# Patient Record
Sex: MALE | Race: WHITE
[De-identification: names, ages, dates, MRNs, and addresses within clinical notes are randomized per-mention and may not be internally consistent; named-entity substitution may affect disease eponyms.]

---

## 2019-02-07 ENCOUNTER — HOSPITAL ENCOUNTER (EMERGENCY)
Dept: HOSPITAL 25 - ED | Age: 34
Discharge: HOME | End: 2019-02-07
Payer: COMMERCIAL

## 2019-02-07 VITALS — DIASTOLIC BLOOD PRESSURE: 68 MMHG | SYSTOLIC BLOOD PRESSURE: 114 MMHG

## 2019-02-07 DIAGNOSIS — R53.1: Primary | ICD-10-CM

## 2019-02-07 DIAGNOSIS — F31.9: ICD-10-CM

## 2019-02-07 DIAGNOSIS — Z87.891: ICD-10-CM

## 2019-02-07 LAB
ALBUMIN SERPL BCG-MCNC: 4.2 G/DL (ref 3.2–5.2)
ALBUMIN/GLOB SERPL: 1.9 {RATIO} (ref 1–3)
ALP SERPL-CCNC: 47 U/L (ref 34–104)
ALT SERPL W P-5'-P-CCNC: 11 U/L (ref 7–52)
ANION GAP SERPL CALC-SCNC: 6 MMOL/L (ref 2–11)
AST SERPL-CCNC: 16 U/L (ref 13–39)
BASOPHILS # BLD AUTO: 0 10^3/UL (ref 0–0.2)
BUN SERPL-MCNC: 15 MG/DL (ref 6–24)
BUN/CREAT SERPL: 16.1 (ref 8–20)
CALCIUM SERPL-MCNC: 9 MG/DL (ref 8.6–10.3)
CHLORIDE SERPL-SCNC: 104 MMOL/L (ref 101–111)
CK SERPL-CCNC: 136 U/L (ref 10–223)
EOSINOPHIL # BLD AUTO: 0.2 10^3/UL (ref 0–0.6)
GLOBULIN SER CALC-MCNC: 2.2 G/DL (ref 2–4)
GLUCOSE SERPL-MCNC: 110 MG/DL (ref 70–100)
HCO3 SERPL-SCNC: 29 MMOL/L (ref 22–32)
HCT VFR BLD AUTO: 39 % (ref 42–52)
HGB BLD-MCNC: 13 G/DL (ref 14–18)
LYMPHOCYTES # BLD AUTO: 2.3 10^3/UL (ref 1–4.8)
MAGNESIUM SERPL-MCNC: 2 MG/DL (ref 1.9–2.7)
MCH RBC QN AUTO: 31 PG (ref 27–31)
MCHC RBC AUTO-ENTMCNC: 33 G/DL (ref 31–36)
MCV RBC AUTO: 93 FL (ref 80–94)
MONOCYTES # BLD AUTO: 0.7 10^3/UL (ref 0–0.8)
NEUTROPHILS # BLD AUTO: 3.6 10^3/UL (ref 1.5–7.7)
NRBC # BLD AUTO: 0 10^3/UL
NRBC BLD QL AUTO: 0
PLATELET # BLD AUTO: 190 10^3/UL (ref 150–450)
POTASSIUM SERPL-SCNC: 3.6 MMOL/L (ref 3.5–5)
PROT SERPL-MCNC: 6.4 G/DL (ref 6.4–8.9)
RBC # BLD AUTO: 4.21 10^6/UL (ref 4–5.4)
SODIUM SERPL-SCNC: 139 MMOL/L (ref 135–145)
WBC # BLD AUTO: 6.8 10^3/UL (ref 3.5–10.8)

## 2019-02-07 PROCEDURE — 99283 EMERGENCY DEPT VISIT LOW MDM: CPT

## 2019-02-07 PROCEDURE — 80164 ASSAY DIPROPYLACETIC ACD TOT: CPT

## 2019-02-07 PROCEDURE — 82550 ASSAY OF CK (CPK): CPT

## 2019-02-07 PROCEDURE — 85025 COMPLETE CBC W/AUTO DIFF WBC: CPT

## 2019-02-07 PROCEDURE — 96360 HYDRATION IV INFUSION INIT: CPT

## 2019-02-07 PROCEDURE — 83735 ASSAY OF MAGNESIUM: CPT

## 2019-02-07 PROCEDURE — 96361 HYDRATE IV INFUSION ADD-ON: CPT

## 2019-02-07 PROCEDURE — 36415 COLL VENOUS BLD VENIPUNCTURE: CPT

## 2019-02-07 PROCEDURE — 80053 COMPREHEN METABOLIC PANEL: CPT

## 2019-02-07 NOTE — XMS REPORT
Continuity of Care Document (CCD)

 Created on:2019



Patient:Slade Moeller

Sex:Male

:1985

External Reference #:[id number]





Demographics







 Address  [address] [place] [address]

 

 Mobile Phone  [phone]

 

 Email Address  [email or web address]

 

 Preferred Language  en

 

 Marital Status  Declined to Specify/Unknown

 

 [employer] Affiliation  Unknown

 

 Race  Unknown

 

 Ethnic Group  Declined to Specify/Unknown









Author







 Name  Cortez Reese









Support







 Name  Relationship  Address  Phone

 

 Shawna Mathew  Unavailable  Unavailable  [phone]









Care Team Providers







 Name  Role  Phone

 

 Parth Crenshaw MD  Primary Care Physician  Unavailable









Payers







 Type  Date  Identification Numbers  Payment Provider  Subscriber

 

   Effective:  Policy Number: [id number]  [employer]  Slade Moeller



   2017      









 PayID: [id number]  [address] [address]









 [place] [address]







Advance Directives







 Description

 

 No Information Available







Problems







 Description

 

 No Information







Family History







 Date  Family Member(s)  Problem(s)  Comments

 

   Father  Congestive Heart Failure (CHF)  

 

   Father  Congestive Heart Failure (CHF)  WPW Syndrome



       alcoholic

 

   Father  Hypercholesterolemia  

 

   Mother  Hypercholesterolemia  

 

   Mother  Hypercholesterolemia  MGF dec of MI

 

 Onset:  Siblings  3  



 (2017)      

 

   Siblings  3  1 brother born with



       "hole in the heart"







Social History







 Type  Date  Description  Comments

 

 Birth Sex    Unknown  

 

 Marital Status    Single  

 

 Lives With    Roommate  

 

 Occupation      

 

 ETOH Use    recovering alcoholic  2019 still trying



       to get to a state of



       abstinence in regards



       to alcohol

 

 Tobacco Use  Start: Unknown  Patient has never  



     smoked  

 

 Recreational Drug Use    Denies Drug Use  

 

 Smoking Status  Reviewed: 19  Patient has never  



     smoked  

 

 Exercise Type/Frequency    Exercises regularly  







Allergies, Adverse Reactions, Alerts







 Description

 

 No Known Drug Allergies







Medications







 Medication  Date  Status  Form  Strength  Qnty  SIG  Indications  Ordering



                 Provider

 

 Depakote    Active    500mg    3 po q hs    Unknown



   00              

 

 Seroquel    Active  Tablets  50mg    take 1/2    Unknown



   00          tablet by    



             mouth at    



             bedtime    

 

                 

 

 Ibuprofen    Hx  Tablets  600mg    three    Unknown



   00 -          times a    



   20          day prn    



   17              

 

 Azelastine HCL    Hx  Solution  0.05%    1 drop    Unknown



 (Ophthalmic)  00 -          each eye    



   11/05/20          twice    



   17          daily    







Immunizations







 Description

 

 No Information Available







Vital Signs







 Date  Vital  Result  Comment

 

 2019  3:56pm  Height  68 inches  5'8"









 Weight  180.00 lb  with shoes

 

 Heart Rate  78 /min  

 

 BP Systolic Sitting  118 mmHg  

 

 BP Diastolic Sitting  80 mmHg  

 

 BMI (Body Mass Index)  27.4 kg/m2  

 

 Ejection Fraction  55-60%  echo 17 12:56pm  Height  68 inches  5'8"









 Weight  195.00 lb  w/shoes

 

 Heart Rate  80 /min  

 

 BP Systolic Sitting  112 mmHg  LA lg cuff

 

 BP Diastolic Sitting  74 mmHg  LA lg cuff

 

 BMI (Body Mass Index)  29.6 kg/m2  

 

 Ejection Fraction  55-60%  Echo 17  8:34am  Height  68 inches  5'8"









 Weight  195.75 lb  with shoes

 

 Heart Rate  68 /min  

 

 BP Systolic Sitting  114 mmHg  LA reg cuff

 

 BP Diastolic Sitting  78 mmHg  LA reg cuff

 

 BMI (Body Mass Index)  29.8 kg/m2  







Results







 Test  Date  Facility  Test  Result  H/L  Range  Note

 

 Comp Metabolic Panel  2017  [facility]  Sodium  138 mmol/L  N
  133-145  



     [address]          



     [place] [address] [phone]          









 Potassium  4.6 mmol/L  N  3.5-5.0  

 

 Chloride  101 mmol/L  N  101-111  

 

 Co2 Carbon Dioxide  30 mmol/L  N  22-32  

 

 Anion Gap  7 mmol/L  N  2-11  

 

 Glucose  79 mg/dL  N    

 

 Blood Urea Nitrogen  11 mg/dL  N  6-24  

 

 Creatinine  0.83 mg/dL  N  0.67-1.17  

 

 BUN/Creatinine Ratio  13.3  N  8-20  

 

 Calcium  9.4 mg/dL  N  8.6-10.3  

 

 Total Protein  6.5 g/dL  N  6.4-8.9  

 

 Albumin  4.3 g/dL  N  3.2-5.2  

 

 Globulin  2.2 g/dL  N  2-4  

 

 Albumin/Globulin Ratio  2.0  N  1-3  

 

 Total Bilirubin  0.60 mg/dL  N  0.2-1.0  

 

 Alkaline Phosphatase  46 U/L  N    

 

 Alt  9 U/L  N  7-52  

 

 Ast  19 U/L  N  13-39  

 

 Egfr Non-  106.1    >60  

 

 Egfr   136.4    >60  1









 Laboratory test  2017  [facility]  TSH (Thyroid  1.78 mcIU/mL
  N  0.34-5.60  2



 finding    [address]  Stim Horm)        



     [place] [address] [phone]          









 Vitamin B12  423 pg/mL  N  180-914  3









 Vitamin D 1,25  2017  [facility]  Vitamin D Total  32.9 ng/mL
  N  20-50  4



 And Vitamin D,2    [address]  25(Oh)        



     [place] [address] [phone]          









 Vitamin D, 1,25 Dihydroxy  42 pg/mL    18-64  5









 Lipid Panel -  2017  [facility]  Creatine Kinase(CK)  119 U/L
  N    6



 JFM    [address]          



     [place] [address] [phone]          

 

 Lipid Profile  2017  [facility]  Triglycerides  66 mg/dL      
7



 (Trig/Chol/HDL    [address]          



 )    [place] [address] [phone]          









 Cholesterol  168 mg/dL      8

 

 HDL Cholesterol  49.8 mg/dL      9

 

 LDL Cholesterol  105 mg/dL      10









 Laboratory test  2017  [facility]  Erythrocyte Sed  5 mm/Hr  
N  0-14  11



 finding    [address]  Rate        



     [place] [address] [phone]          









 C Reactive Protein  < 1.00 mg/L  N  < 5.00  12









 1  *******Because ethnic data is not always readily available,



   this report includes an eGFR for both -Americans and



   non- Americans.****



   The National Kidney Disease Education Program (NKDEP) does



   not endorse the use of the MDRD equation for patients that



   are not between the ages of 18 and 70, are pregnant, have



   extremes of body size, muscle mass, or nutritional status,



   or are non- or non-.



   According to the National Kidney Foundation, irrespective of



   diagnosis, the stage of the disease is based on the level of



   kidney function:



   Stage Description                      GFR(mL/min/1.73 m(2))



   1     Kidney damage with normal or decreased GFR       90



   2     Kidney damage with mild decrease in GFR          60-89



   3     Moderate decrease in GFR                         30-59



   4     Severe decrease in GFR                           15-29



   5     Kidney failure                       <15 (or dialysis)

 

 2  FASTING



   fasting

 

 3  Normal Range 180 to 914



   Indeterminate Range 145 to 180



   Deficient Range  <145

 

 4  FASTING



   fasting

 

 5  -------------------ADDITIONAL INFORMATION-------------------



   This test was developed and its performance characteristics



   determined by [facility] in a manner consistent with CLIA



   requirements. This test has not been cleared or approved by



   the U.S. Food and Drug Administration.



   Test Performed by:



   [facility]



   [address], [place] [address]

 

 6  FASTING



   fasting

 

 7  Desirable: <150



   Borderline High: 150-199



   High: 200-499



   Very High: >500

 

 8  Desirable: <200



   Borderline High: 200-239



   High: >239

 

 9  Low: <40



   Desirable: 40-60



   High: >60

 

 10  Desirable: <100



   Near Optimal: 100-129



   Borderline High: 130-159



   High: 160-189



   Very High: >189

 

 11  FASTING



   fasting

 

 12  Acute inflammation:  >10.00







Procedures







 Date  Code  Description  Status

 

 2019  20758  EKG Tracing & Interpretation  Completed

 

 2017  11241  Holter Monitor Review (24 hr)dr review & interp only  
Completed

 

 2017  27606  ECG Monitor/Recording W/Visual Superimposition Scanning  
Completed

 

 2017  50660  ECHO Stress Test Incl Perf Contiuous ekg Monitoring W/Phys  
Completed



     Superv  

 

 2017  66815  ECHO Transthoracic, Real-Time 2D With Doppler And Color  
Completed



     Flow  

 

 2017  74344  EKG Tracing & Interpretation  Completed







Encounters







 Type  Date  Location  Provider  Dx  Diagnosis

 

 Office Visit  2017  [facility]  Qutaybeh S.  R00.2  Palpitations



   1:00p    BRIDGET May    

 

 Office Visit  2017  [facility]  Qutaybeh S.  R00.2  Palpitations



   9:00a    BRIDGET May    









 R07.9  Chest pain, unspecified

 

 F10.10  Alcohol abuse, uncomplicated

 

 R06.02  Shortness of breath

 

 R94.31  Abnormal electrocardiogram [ECG] [EKG]

 

 Z82.49  Family hx of ischem heart dis and oth dis of the circ sys







Plan of Treatment

Future Appointment(s):2019 11:00 am - [facility] ECHO Schedule at [facility]2019  8:30 am - Nurse Visit cc at [facility]2019 10
:00 am - Nurse Visit cc at [facility]2019 - Qutaybeh S. Maghaydah, M.D.R00.2 PalpitationsFollow up:[age] yr ovI77.819 Aortic ectasia, unspecified 
siteNew Orders:Echocardiogram, Scheduled: 19R00.1 Bradycardia, 
unspecifiedNew Orders:24 hour holter monitor, Scheduled: 19R53.83 Other 
fatigue

## 2019-02-07 NOTE — ED
Complex/Multi-Sys Presentation





- HPI Summary


HPI Summary: 


This patient is a 33 year old M presenting to ED with a chief complaint of 

weakness of arms and legs since PTA. He woke up an hour after taking Seroquel (

he takes 25 mg every night but has been skipping some nights recently) and 

couldnt move. He called the ambulance and they went to his house and checked 

up on him. He went to the bathroom after the ambulance left and vomited. The 

patient rates the pain 4/10 in severity. Symptoms aggravated by nothing. 

Symptoms alleviated by nothing. Patient has been going to a cardiologist 

because of CP. He reports that he has lied to his cardiologist about using 

marijuana.





- History Of Current Complaint


Chief Complaint: EDGeneral


Time Seen by Provider: 02/07/19 02:47


Hx Obtained From: Patient


Onset/Duration: Sudden Onset, Lasting Hours


Timing: Hours


Severity Currently: Mild


Severity Initially: Mild - 4/10


Aggravating Factor(s): nothing


Alleviating Factor(s): nothing


Associated Signs And Symptoms: Positive: Weakness - of arms and legs, Nausea, 

Vomiting





- Allergies/Home Medications


Allergies/Adverse Reactions: 


 Allergies











Allergy/AdvReac Type Severity Reaction Status Date / Time


 


No Known Allergies Allergy   Verified 11/21/16 20:48














PMH/Surg Hx/FS Hx/Imm Hx


Endocrine/Hematology History: 


   Denies: Hx Diabetes, Hx Thyroid Disease


Cardiovascular History: 


   Denies: Hx Hypertension


Respiratory History: 


   Denies: Hx Asthma, Hx Chronic Obstructive Pulmonary Disease (COPD)


GI History: 


   Denies: Hx Ulcer


Psychiatric History: Reports: Hx Bipolar Disorder


Infectious Disease History: Yes


Infectious Disease History: 


   Denies: Hx Hepatitis, Hx Human Immunodeficiency Virus (HIV), Traveled 

Outside the [place] in Last 30 Days





- Family History


Known Family History: 


   Negative: Cardiac Disease, Hypertension





- Social History


Alcohol Use: None


Substance Use Type: Reports: Marijuana


Smoking Status (MU): Former Smoker





Review of Systems


Positive: Chest Pain - sees a cardiologist for this issue


Positive: Vomiting, Nausea


Positive: Weakness - of arms and legs


All Other Systems Reviewed And Are Negative: Yes





Physical Exam





- Summary


Physical Exam Summary: 


VITAL SIGNS: Reviewed.


GENERAL: Patient is a well-developed and nourished MALE who is lying 

comfortable in the stretcher. Patient is not in any acute respiratory distress.


HEAD AND FACE: No signs of trauma. No ecchymosis, hematomas or skull 

depressions. No sinus tenderness.


EYES: PERRLA, EOMI x 2, No injected conjunctiva, no nystagmus.


EARS: Hearing grossly intact. Ear canals and tympanic membranes are within 

normal limits.


MOUTH: Oropharynx within normal limits.


NECK: Supple, trachea is midline, no adenopathy, no JVD, no carotid bruit, no c-

spine tenderness, neck with full ROM.


CHEST: Symmetric, no tenderness at palpation


LUNGS: Clear to auscultation bilaterally. No wheezing or crackles.


CVS: Regular rate and rhythm, S1 and S2 present, no murmurs or gallops 

appreciated.


ABDOMEN: Soft, non-tender. No signs of distention. No rebound no guarding, and 

no masses palpated. Bowel sounds are normal.


EXTREMITIES: FROM in all major joints, no edema, no cyanosis or clubbing.


NEURO: Alert and oriented x 3. No acute neurological deficits. Speech is normal 

and follows commands.


SKIN: Dry and warm


PSYCH: anxious


Triage Information Reviewed: Yes


Vital Signs On Initial Exam: 


 Initial Vitals











Temp Pulse Resp BP Pulse Ox


 


 97.1 F   76   22   118/76   99 


 


 02/07/19 02:38  02/07/19 02:38  02/07/19 02:38  02/07/19 02:38  02/07/19 02:38











Vital Signs Reviewed: Yes





Diagnostics





- Vital Signs


 Vital Signs











  Temp Pulse Resp BP Pulse Ox


 


 02/07/19 02:38  97.1 F  76  22  118/76  99














- Laboratory


Result Diagrams: 


 02/07/19 03:00





 02/07/19 03:00


Lab Statement: Any lab studies that have been ordered have been reviewed, and 

results considered in the medical decision making process.





Complex Multi-Symp Course/Dx


Assessment/Plan: This patient is a 33 year old M presenting to ED with a chief 

complaint of weakness of arms and legs since PTA. He woke up an hour after 

taking Seroquel (he takes 25 mg every night but has been skipping some nights 

recently) and couldnt move. He called the ambulance and they went to his house 

and checked up on him. He went to the bathroom after the ambulance left and 

vomited. Patient has been going to a cardiologist because of CP. He reports 

that he has lied to his cardiologist about using marijuana. In the ED course, 

the patient was given fluids. This patient will be discharged with dx of 

weakness. Patient understands and agrees with this plan.





- Diagnoses


Differential Diagnoses/HQI/PQRI: Other - weakness


Provider Diagnoses: 


 Weakness








Discharge





- Sign-Out/Discharge


Documenting (check all that apply): Patient Departure - discharge


Patient Received Moderate/Deep Sedation with Procedure: No





- Discharge Plan


Condition: Stable


Disposition: HOME


Patient Education Materials:  Weakness (ED)


Referrals: 


Parth Crenshaw MD [Primary Care Provider] -  (Follow up in 1-2 days.)


Additional Instructions: 


RETURN TO THE EMERGENCY DEPARTMENT FOR CHANGING OR WORSENING SYMPTOMS. FOLLOW 

UP WITH PCP IN 1-2 DAYS.





- Attestation Statements


Document Initiated by Scribe: Yes


Documenting Scribe: Stuart Spencer


Provider For Whom Scribe is Documenting (Include Credential): Neymar Cr MD


Scribe Attestation: 


Stuart TERRY, scribed for Neymar Cr MD on 02/07/19 at 0350. 


Status of Scribe Document: Ready

## 2019-02-07 NOTE — XMS REPORT
Continuity of Care Document (CCD)

 Created on:2019



Patient:Slade Moeller

Sex:Male

:1985

External Reference #:[id number]





Demographics







 Address  [address] [place] [address]

 

 Mobile Phone  [phone]

 

 Email Address  [email or web address]

 

 Preferred Language  en

 

 Marital Status  Declined to Specify/Unknown

 

 [employer] Affiliation  Unknown

 

 Race  Unknown

 

 Ethnic Group  Declined to Specify/Unknown









Author







 Name  Deondre Rodriguez M.D.

 

 Address  [address]



   Unavailable



   [place] [address]









Support







 Name  Relationship  Address  Phone

 

 Alva Cowan  Mother  Unavailable  [phone]









Care Team Providers







 Name  Role  Phone

 

 Parth Crenshaw M.D.  Primary Care Physician  Unavailable









Payers







 Type  Date  Identification Numbers  Payment Provider  Subscriber

 

     Policy Number: [id number]  [facility]  Trent Moeller









 [address] [place] [id number]









     Policy Number: [id number]  [facility]  Slade Moeller









 PayID: [id number]  [address] [address]









 [place] [address]







Advance Directives







 Description

 

 No Information Available







Problems







 Description

 

 No Information







Family History







 Description

 

 No Information Available







Social History







 Type  Date  Description  Comments

 

 Birth Sex    Unknown  

 

 Occupation    /  

 

 Tobacco Use  Start: Unknown  Never Smoked Cigarettes  

 

 Tobacco Use  Start: Unknown  Never Smoked Cigars  

 

 Tobacco Use  Start: Unknown  Never Smoked A Pipe  

 

 Smokeless Tobacco    Never Used Smokeless Tobacco  

 

 ETOH Use    Denies alcohol use  

 

 Tobacco Use  Start: Unknown  Patient has never smoked  

 

 Smoking Status  Reviewed: 19  Patient has never smoked  







Allergies, Adverse Reactions, Alerts







 Description

 

 No Known Drug Allergies







Medications







 Medication  Date  Status  Form  Strength  Qnty  SIG  Indications  Ordering



                 Provider

 

 Seroquel    Active  Tablets  25mg    as directed    Unknown



   00              

 

 Depakote    Active  Tablets DR  1500mg    1 by mouth    Unknown



   00          twice a day    







Immunizations







 Description

 

 No Information Available







Vital Signs







 Date  Vital  Result  Comment

 

 2019 10:19am  Weight  180.00 lb  









 Weight  81.648 kg  

 

 Height  68 inches  5'8"

 

 Height in cm's  172.7 cm  

 

 BMI (Body Mass Index)  27.4 kg/m2  







Results







 Description

 

 No Information Available







Procedures







 Date  Code  Description  Status

 

 2019  56221  Nasal Endoscopy, Diagnostic  Completed







Encounters







 Type  Date  Location  Provider  Dx  Diagnosis

 

 Office Visit  2019  [place]  Deondre EMERSON  G50.1  Atypical facial



   10:00a  08  SARA Rodriguez.    pain









 J01.20  Acute ethmoidal sinusitis, unspecified







Plan of Treatment

2019 - Deondre Rodriguez M.D.G50.1 Atypical facial painComments:The 
patient has been having some pain around his right eye since being sick with a 
URI in December. He saw the eye doctor and his eye is normal. He showed a 
Panorex from August and that does not show anything in his sinus.  His nasal 
endoscopy was normal. I think it is fine to use the salve in his nose but that 
is not going to get to the sinuses.I suspect he may have an ethmoidal sinusitis 
following the URI.  He says that over the past week it is getting better.  I 
think are options are:Do nothing since he is getting betterA course of 
antibiotics, like Augmentin or a second generation cephalosporinCT scanFor now 
he will give it more time and call for antibiotics if needed.J01.20 Acute 
ethmoidal sinusitis, unspecified

## 2019-07-03 ENCOUNTER — HOSPITAL ENCOUNTER (EMERGENCY)
Dept: HOSPITAL 25 - UCEAST | Age: 34
Discharge: HOME | End: 2019-07-03
Payer: COMMERCIAL

## 2019-07-03 VITALS — SYSTOLIC BLOOD PRESSURE: 98 MMHG | DIASTOLIC BLOOD PRESSURE: 51 MMHG
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
## 2019-07-03 DIAGNOSIS — Z87.891: ICD-10-CM

## 2019-07-03 DIAGNOSIS — S09.90XA: Primary | ICD-10-CM
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
## 2019-07-03 DIAGNOSIS — S16.1XXA: ICD-10-CM

## 2019-07-03 PROCEDURE — 70450 CT HEAD/BRAIN W/O DYE: CPT

## 2019-07-03 PROCEDURE — 72125 CT NECK SPINE W/O DYE: CPT

## 2019-07-03 PROCEDURE — 70486 CT MAXILLOFACIAL W/O DYE: CPT

## 2019-07-03 PROCEDURE — 99211 OFF/OP EST MAY X REQ PHY/QHP: CPT

## 2019-07-03 PROCEDURE — G0463 HOSPITAL OUTPT CLINIC VISIT: HCPCS
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
